# Patient Record
Sex: FEMALE | Race: WHITE | NOT HISPANIC OR LATINO | Employment: OTHER | URBAN - METROPOLITAN AREA
[De-identification: names, ages, dates, MRNs, and addresses within clinical notes are randomized per-mention and may not be internally consistent; named-entity substitution may affect disease eponyms.]

---

## 2021-03-29 ENCOUNTER — TELEPHONE (OUTPATIENT)
Dept: PSYCHIATRY | Facility: CLINIC | Age: 61
End: 2021-03-29

## 2021-03-29 NOTE — TELEPHONE ENCOUNTER
Donny Shell is looking to schedule an appointment for a Psychiatrist  Patient has [facility] and can be reached anytime  Donny Shell can be reached at [phone]

## 2021-04-20 NOTE — TELEPHONE ENCOUNTER
Behavorial Health Outpatient Intake Questions    Referred by:PCP    Please advised interviewee that they need to answer all questions truthfully to allow for best care and any misrepresentations of information may affect their ability to be seen at this clinic   => Was this discussed? Yes     [facility] -     Presenting Problem (in patient's words): Need medication mgmt, bipolar depression    Are there any developmental disabilities? ? If yes, can they speak to you on the phone? If they are too limited to speak to you on phone, refer out No    Are you taking any psychiatric medications? Yes    => If yes, who prescribes? If yes, are they injectable medications? Lamictal 200 mg, Lexapro 20 mg      Does the patient have a language barrier or hearing impairment? No    Have you been treated at [facility] by a therapist or a doctor in the past? If yes, who? No    Has the patient been hospitalized for mental health? No   If yes, how long ago was last hospitalization and where was it? Do you actively use alcohol or marijuana or illegal substances? If yes, what and how much - refer out to Drug and alcohol treatment if use is excessive or daily use of illegal substances No concerns of substance abuse are reported  Do you have a community treatment team or ? No    Legal History-     Does the patient have any history of arrests, [facility]/jail time, or DUIs? No  If Yes-  1) What types of charges? 2) When were they last incarcerated? 3) Are they currently on parole or probation? Minor Child-    Who has custody of the child? Is there a custody agreement? If there is a custody agreement remind parent that they must bring a copy to the first appt or they will not be seen       Intake Team, please check with provider before scheduling if flags come up such as:  - complex case  - legal history (other than DUI)  - communication barrier concerns are present  - if, in your judgment, this needs further review    ACCEPTED as a patient Yes  => Appointment Date: Monday August 23,2021 at 1:00pm with Dr Colmenares    Referred Elsewhere? No    Name of Insurance Co:[employer] ID# [id number]  Insurance Phone #  If ins is primary or secondary Primary  If patient is a minor, parents information such as Name, D  O B of guarantor

## 2021-08-23 ENCOUNTER — OFFICE VISIT (OUTPATIENT)
Dept: PSYCHIATRY | Facility: CLINIC | Age: 61
End: 2021-08-23
Payer: COMMERCIAL

## 2021-08-23 DIAGNOSIS — F31.70 BIPOLAR DISORDER IN FULL REMISSION, MOST RECENT EPISODE UNSPECIFIED TYPE (HCC): Primary | ICD-10-CM

## 2021-08-23 PROBLEM — Z98.890 S/P LASIK (LASER ASSISTED IN SITU KERATOMILEUSIS): Status: ACTIVE | Noted: 2021-07-20

## 2021-08-23 PROBLEM — H25.13 NUCLEAR SCLEROSIS OF BOTH EYES: Status: ACTIVE | Noted: 2021-07-20

## 2021-08-23 PROBLEM — H40.013 OPEN ANGLE WITH BORDERLINE FINDINGS, LOW RISK, BILATERAL: Status: ACTIVE | Noted: 2021-07-20

## 2021-08-23 PROCEDURE — 90792 PSYCH DIAG EVAL W/MED SRVCS: CPT | Performed by: PSYCHIATRY & NEUROLOGY

## 2021-08-23 RX ORDER — LAMOTRIGINE 200 MG/1
200 TABLET ORAL
Qty: 90 TABLET | Refills: 0 | Status: SHIPPED | OUTPATIENT
Start: 2021-08-23 | End: 2021-11-15

## 2021-08-23 RX ORDER — ESCITALOPRAM OXALATE 10 MG/1
10 TABLET ORAL DAILY
COMMUNITY
Start: 2021-04-22 | End: 2021-08-23 | Stop reason: SDUPTHER

## 2021-08-23 RX ORDER — ESCITALOPRAM OXALATE 10 MG/1
10 TABLET ORAL DAILY
Qty: 90 TABLET | Refills: 0 | Status: SHIPPED | OUTPATIENT
Start: 2021-08-23 | End: 2021-11-15

## 2021-08-23 RX ORDER — LAMOTRIGINE 200 MG/1
200 TABLET ORAL
COMMUNITY
Start: 2021-04-22 | End: 2021-08-23 | Stop reason: SDUPTHER

## 2021-08-23 RX ORDER — ASCORBIC ACID 500 MG
500 TABLET ORAL DAILY
COMMUNITY

## 2021-08-23 NOTE — BH TREATMENT PLAN
TREATMENT PLAN (Medication Management Only)        [facility]    Name and Date of Birth:  Brian Elkins 59 y o  1960  Date of Treatment Plan: August 23, 2021  Diagnosis/Diagnoses:    1  Bipolar disorder in full remission, most recent episode unspecified type [facility])      Strengths/Personal Resources for Self-Care: supportive family, taking medications as prescribed, ability to communicate needs, ability to understand psychiatric illness  Area/Areas of need (in own words): anxiety, mood swings  1  Long Term Goal: Continue to feel stable     Target Date:3 months - 11/23/2021  Person/Persons responsible for completion of goal: Inna Watkins  2  Short Term Objective (s) - How will we reach this goal?:   A  Provider new recommended medication/dosage changes and/or continue medication(s): continue current medications as prescribed  B  N/A   C  N/A  Target Date:3 months - 11/23/2021  Person/Persons Responsible for Completion of Goal: Inna Watkins  Progress Towards Goals: continuing treatment  Treatment Modality: medication management every 3 months  Review due 180 days from date of this plan: 4 months - 12/23/2021  Expected length of service: maintenance  My Physician/PA/NP and I have developed this plan together and I agree to work on the goals and objectives  I understand the treatment goals that were developed for my treatment

## 2021-08-23 NOTE — PSYCH
[facility]    Name and Date of Birth:  Cheyanne Donovan 59 y o  1960 MRN: [id number]    Date of Visit: August 23, 2021    Source of Information:  Patient herself who seems to be good historian  Her medical records from [address] was also reviewed  Chief Complaint:  Management of her psychiatric medications  Patient moved from [place] to this area last year  HPI:  This is a 75-year-old  female, , has 2 adult children, currently lives with her  in [place], [place]  The patient is retired  She reports she was diagnosed with bipolar disorder and anxiety disorder in August of 2007  Has been following with psychiatrist on and off since then  She last saw psychiatrist was in [place] last year  Her psychiatric medication was being refilled by her previous psychiatrist till early this year and then her primary care physician at [address] refill till this appointment  She also reports following with therapist in the past   Reports currently on Lamictal 200 mg 1 tablet once a day and Lexapro 10 mg daily  She reports she has been on this medication since 2007 and has been very effective in controlling her symptoms  Denies any psychiatric inpatient admission in the past   Denies being on any other psychiatric medication in the past   The patient came in for initial psychiatric evaluation  She reports she has been doing very well  Reports her mood has been stable  Denies any manic or hypomanic episodes since last 10 years  She reports in 2007 her mother passed away and then around the same time her father was diagnosed with lung cancer  He passed away or early next year  Patient took care of him  She though also was diagnosed around the same time with breast cancer    She reported it led to significant anxiety and she started following with a psychiatrist who also diagnosed her with bipolar disorder based on her history  She was started on the above to mention medication and she reports she has been on that till now and it has been very effective with no side effects  She described her manic episodes in the past as lasting for few days to week at a time where she would feel either very agitated or angry or being in a great mood along with not sleeping well which can last for few weeks along with hyper talkative and loud speech, high energy level, feel like running away from everything at times  She reported she had few episodes on and off till 2011 and since then the symptoms has been under control  She reports her manic episode would be followed by depressive episode where she would be depressed for many weeks at a time where she would be crying for no reason, feeling angry at times having anger outburst, not feeling like talking to anybody  The patient also reports she has struggle with anxiety in the past but since she had been on Lexapro she feels it has been much better  She reports recently she had been very anxious for last few weeks because of her moving to her new home but now she settled down and feels better  Reports her sleep presently has been very good  Reports appetite has been good  Denies any suicidal thoughts or urges to hurt other  Denies any history of suicide attempt or self-harming behavior  Denies any history of homicidal thoughts  Reports energy level and concentration has been good  Does not endorse anhedonia and reports enjoys working out and doing gardening  As mentioned she reports her anxiety was high lately but presently it is under control  Denies any history of any panic attacks or social anxiety  Denies any history of any abuse or trauma  Does not endorse any symptoms or obsessive-compulsive disorder  Denies any history of auditory or visual hallucination    Does not endorse any paranoia, delusional or grandiose ideation  Denies any history of eating disorder  Review Of Systems:    Constitutional negative   ENT negative   Cardiovascular negative   Respiratory negative   Gastrointestinal negative   Genitourinary negative   Musculoskeletal negative   Integumentary negative   Neurological negative   Endocrine negative   Other Symptoms none       Past Psychiatric History:  Check HPI        Traumatic History:     Abuse: none  Other Traumatic Events: none       Substance Abuse History:  Reports drinks alcohol occasionally around 2 beers a week  Denies any history of alcohol abuse or substance use  Longest clean time: not applicable  History of Inpatient/Outpatient rehabilitation program: no  Smoking history: denies use  Use of caffeine: 2 cups of coffee per day    Family Psychiatric/Substance Use History:     Psychiatric Illness:  Brother - anxiety disorder, Daughter - bipolar disorder and anxiety disorder  Substance Abuse:  patient denies  Suicide Attempts:  patient denies    Social History:  Born & Raised in :  [place]  Childhood Experiences:  Great  Education: high school graduate  Learning Disabilities: none  Marital History:   Children: 2 adult children  Living Arrangement: lives in home with   Occupational History: retired  Functioning Relationships: good support system  Legal History: none   History: None      Past Medical History:    Past Medical History:   Diagnosis Date    History of breast cancer      No past medical history pertinent negatives  History reviewed  No pertinent surgical history    Allergies   Allergen Reactions    Sulfa Antibiotics Rash         Current Medications:      Current Outpatient Medications:     escitalopram (LEXAPRO) 10 mg tablet, Take 10 mg by mouth daily, Disp: , Rfl:     lamoTRIgine (LaMICtal) 200 MG tablet, Take 200 mg by mouth daily at bedtime, Disp: , Rfl:     ascorbic acid (VITAMIN C) 500 MG tablet, Take 500 mg by mouth daily, Disp: , Rfl: OBJECTIVE:    Vital signs in last 24 hours: There were no vitals filed for this visit  Mental Status Evaluation:    Appearance age appropriate, casually dressed   Behavior cooperative, calm   Speech normal rate, normal volume, normal pitch   Mood euthymic   Affect brighter   Thought Processes organized, goal directed   Associations intact associations   Thought Content no overt delusions   Perceptual Disturbances: no auditory hallucinations, no visual hallucinations   Abnormal Thoughts  Risk Potential Suicidal ideation - None  Homicidal ideation - None  Potential for aggression - No   Orientation oriented to person, place, time/date and situation   Memory recent and remote memory grossly intact   Consciousness alert and awake   Attention Span Concentration Span attention span and concentration are age appropriate   Intellect appears to be of average intelligence   Insight intact   Judgement intact   Muscle Strength and  Gait normal gait and normal balance   Motor Activity unable to assess today due to virtual visit   Language no difficulty naming common objects, no difficulty repeating a phrase   Fund of Knowledge adequate knowledge of current events  adequate fund of knowledge regarding past history  adequate fund of knowledge regarding vocabulary    Pain none   Pain Scale 0       Laboratory Results:  Reviewed from [address] records  Assessment/Plan:  From evaluation of the patient today and review of her past psychiatric history, I concur with the patient diagnosis of bipolar disorder, seems to be type 1  Currently in full remission  Patient also struggle with anxiety in the past but currently has been doing well  The patient does not seem to meet the criteria for any other mental health disorder  Has a very good support in her   No alcohol or substance abuse history reported  The plan is to continue with the current medication mentioned below with no changes    The patient was educated in detail about benefit, risk, side effects, alternative, contraindication, dosage and frequency  She was specially educated about risk of rash on the Lamictal and the risk of manic or hypomanic episode on Lexapro  The patient denies ever having any manic or hypomanic episodes since she has been on Lexapro and will call us if she noticed any symptoms for it  She also was advised to call us if there is any concern and to call crisis or visit nearby ER in case of any emergency, noticing any skin rash or having any SI or HI  The patient agrees  She will follow-up with me in 3 months or sooner if needed  Diagnoses and all orders for this visit:    Bipolar disorder in full remission, most [facility]    Other orders  -     ascorbic acid (VITAMIN C) 500 MG tablet; Take 500 mg by mouth daily  -     escitalopram (LEXAPRO) 10 mg tablet; Take 10 mg by mouth daily  -     lamoTRIgine (LaMICtal) 200 MG tablet; Take 200 mg by mouth daily at bedtime          Treatment Recommendations:    Check Assessment/Plan section for details  Risks/Benefits/Precautions:      Risks, Benefits And Possible Side Effects Of Medications:    Risks, benefits, and possible side effects of medications explained to Ney Ramos and she verbalizes understanding and agreement for treatment      Controlled Medication Discussion:     Not applicable    Treatment Plan;    Completed and signed during the session: Yes - Treatment Plan done but not signed at time of office visit due to:  Plan reviewed in person and verbal consent given due to COVID social distancing    Christopher Verdin MD 08/23/21

## 2021-11-14 DIAGNOSIS — F31.70 BIPOLAR DISORDER IN FULL REMISSION, MOST RECENT EPISODE UNSPECIFIED TYPE (HCC): ICD-10-CM

## 2021-11-15 RX ORDER — LAMOTRIGINE 200 MG/1
TABLET ORAL
Qty: 90 TABLET | Refills: 0 | Status: SHIPPED | OUTPATIENT
Start: 2021-11-15 | End: 2022-02-28

## 2021-11-15 RX ORDER — ESCITALOPRAM OXALATE 10 MG/1
TABLET ORAL
Qty: 90 TABLET | Refills: 0 | Status: SHIPPED | OUTPATIENT
Start: 2021-11-15 | End: 2022-02-28

## 2021-11-23 ENCOUNTER — OFFICE VISIT (OUTPATIENT)
Dept: PSYCHIATRY | Facility: CLINIC | Age: 61
End: 2021-11-23
Payer: COMMERCIAL

## 2021-11-23 DIAGNOSIS — F31.70 BIPOLAR DISORDER IN FULL REMISSION, MOST RECENT EPISODE UNSPECIFIED TYPE (HCC): Primary | ICD-10-CM

## 2021-11-23 PROCEDURE — 99213 OFFICE O/P EST LOW 20 MIN: CPT | Performed by: PSYCHIATRY & NEUROLOGY

## 2022-05-24 DIAGNOSIS — F31.70 BIPOLAR DISORDER IN FULL REMISSION, MOST RECENT EPISODE UNSPECIFIED TYPE (HCC): ICD-10-CM

## 2022-05-24 RX ORDER — LAMOTRIGINE 200 MG/1
TABLET ORAL
Qty: 30 TABLET | Refills: 2 | Status: SHIPPED | OUTPATIENT
Start: 2022-05-24

## 2022-06-08 DIAGNOSIS — F31.70 BIPOLAR DISORDER IN FULL REMISSION, MOST RECENT EPISODE UNSPECIFIED TYPE (HCC): ICD-10-CM

## 2022-06-08 RX ORDER — ESCITALOPRAM OXALATE 10 MG/1
TABLET ORAL
Qty: 30 TABLET | Refills: 2 | Status: SHIPPED | OUTPATIENT
Start: 2022-06-08 | End: 2022-06-09